# Patient Record
Sex: FEMALE | Race: WHITE | URBAN - METROPOLITAN AREA
[De-identification: names, ages, dates, MRNs, and addresses within clinical notes are randomized per-mention and may not be internally consistent; named-entity substitution may affect disease eponyms.]

---

## 2019-01-01 ENCOUNTER — INFUSION THERAPY VISIT (OUTPATIENT)
Dept: INFUSION THERAPY | Facility: CLINIC | Age: 49
End: 2019-01-01
Attending: INTERNAL MEDICINE
Payer: COMMERCIAL

## 2019-01-01 ENCOUNTER — OFFICE VISIT (OUTPATIENT)
Dept: GASTROENTEROLOGY | Facility: CLINIC | Age: 49
End: 2019-01-01
Attending: INTERNAL MEDICINE
Payer: COMMERCIAL

## 2019-01-01 ENCOUNTER — TELEPHONE (OUTPATIENT)
Dept: GASTROENTEROLOGY | Facility: CLINIC | Age: 49
End: 2019-01-01

## 2019-01-01 ENCOUNTER — HEALTH MAINTENANCE LETTER (OUTPATIENT)
Age: 49
End: 2019-01-01

## 2019-01-01 ENCOUNTER — MYC MEDICAL ADVICE (OUTPATIENT)
Dept: GASTROENTEROLOGY | Facility: CLINIC | Age: 49
End: 2019-01-01

## 2019-01-01 ENCOUNTER — TRANSFERRED RECORDS (OUTPATIENT)
Dept: HEALTH INFORMATION MANAGEMENT | Facility: CLINIC | Age: 49
End: 2019-01-01

## 2019-01-01 ENCOUNTER — ANCILLARY PROCEDURE (OUTPATIENT)
Dept: ULTRASOUND IMAGING | Facility: CLINIC | Age: 49
End: 2019-01-01
Attending: INTERNAL MEDICINE
Payer: COMMERCIAL

## 2019-01-01 VITALS
TEMPERATURE: 98.3 F | HEART RATE: 90 BPM | HEIGHT: 62 IN | DIASTOLIC BLOOD PRESSURE: 65 MMHG | RESPIRATION RATE: 16 BRPM | BODY MASS INDEX: 32.97 KG/M2 | OXYGEN SATURATION: 99 % | SYSTOLIC BLOOD PRESSURE: 112 MMHG | WEIGHT: 179.2 LBS

## 2019-01-01 VITALS
HEART RATE: 92 BPM | TEMPERATURE: 98.2 F | OXYGEN SATURATION: 97 % | RESPIRATION RATE: 16 BRPM | DIASTOLIC BLOOD PRESSURE: 66 MMHG | SYSTOLIC BLOOD PRESSURE: 112 MMHG

## 2019-01-01 VITALS
OXYGEN SATURATION: 97 % | DIASTOLIC BLOOD PRESSURE: 70 MMHG | SYSTOLIC BLOOD PRESSURE: 113 MMHG | HEART RATE: 89 BPM | RESPIRATION RATE: 16 BRPM | TEMPERATURE: 98.5 F

## 2019-01-01 VITALS
RESPIRATION RATE: 18 BRPM | DIASTOLIC BLOOD PRESSURE: 71 MMHG | TEMPERATURE: 97.5 F | HEART RATE: 85 BPM | OXYGEN SATURATION: 94 % | SYSTOLIC BLOOD PRESSURE: 116 MMHG

## 2019-01-01 VITALS
HEART RATE: 88 BPM | OXYGEN SATURATION: 97 % | SYSTOLIC BLOOD PRESSURE: 124 MMHG | RESPIRATION RATE: 16 BRPM | TEMPERATURE: 98.3 F | DIASTOLIC BLOOD PRESSURE: 74 MMHG

## 2019-01-01 DIAGNOSIS — D50.0 IRON DEFICIENCY ANEMIA DUE TO CHRONIC BLOOD LOSS: ICD-10-CM

## 2019-01-01 DIAGNOSIS — K70.31 ALCOHOLIC CIRRHOSIS OF LIVER WITH ASCITES (H): Primary | ICD-10-CM

## 2019-01-01 DIAGNOSIS — F43.10 PTSD (POST-TRAUMATIC STRESS DISORDER): ICD-10-CM

## 2019-01-01 DIAGNOSIS — D50.0 IRON DEFICIENCY ANEMIA DUE TO CHRONIC BLOOD LOSS: Primary | ICD-10-CM

## 2019-01-01 DIAGNOSIS — F41.1 GAD (GENERALIZED ANXIETY DISORDER): ICD-10-CM

## 2019-01-01 DIAGNOSIS — F10.10 ALCOHOL ABUSE: ICD-10-CM

## 2019-01-01 DIAGNOSIS — N18.30 CKD (CHRONIC KIDNEY DISEASE) STAGE 3, GFR 30-59 ML/MIN (H): ICD-10-CM

## 2019-01-01 DIAGNOSIS — Z95.828 S/P TIPS (TRANSJUGULAR INTRAHEPATIC PORTOSYSTEMIC SHUNT): ICD-10-CM

## 2019-01-01 DIAGNOSIS — K70.31 ALCOHOLIC CIRRHOSIS OF LIVER WITH ASCITES (H): ICD-10-CM

## 2019-01-01 DIAGNOSIS — E43 SEVERE PROTEIN-CALORIE MALNUTRITION (H): ICD-10-CM

## 2019-01-01 LAB
ABO + RH BLD: ABNORMAL
ABO + RH BLD: ABNORMAL
ALBUMIN SERPL-MCNC: 2.2 G/DL (ref 3.4–5)
ALBUMIN SERPL-MCNC: 2.8 G/DL (ref 3.4–5)
ALBUMIN SERPL-MCNC: 3.3 G/DL (ref 3.4–5)
ALP SERPL-CCNC: 126 U/L (ref 40–150)
ALP SERPL-CCNC: 156 U/L (ref 40–150)
ALP SERPL-CCNC: 96 U/L (ref 40–150)
ALT SERPL W P-5'-P-CCNC: 19 U/L (ref 0–50)
ALT SERPL W P-5'-P-CCNC: 20 U/L (ref 0–50)
ALT SERPL W P-5'-P-CCNC: 37 U/L (ref 0–50)
ANION GAP SERPL CALCULATED.3IONS-SCNC: 12 MMOL/L (ref 3–14)
ANION GAP SERPL CALCULATED.3IONS-SCNC: 7 MMOL/L (ref 3–14)
ANION GAP SERPL CALCULATED.3IONS-SCNC: 9 MMOL/L (ref 3–14)
AST SERPL W P-5'-P-CCNC: 112 U/L (ref 0–45)
AST SERPL W P-5'-P-CCNC: 30 U/L (ref 0–45)
AST SERPL W P-5'-P-CCNC: 35 U/L (ref 0–45)
BASOPHILS # BLD AUTO: 0 10E9/L (ref 0–0.2)
BASOPHILS # BLD AUTO: 0.1 10E9/L (ref 0–0.2)
BASOPHILS NFR BLD AUTO: 0.2 %
BASOPHILS NFR BLD AUTO: 2.6 %
BILIRUB DIRECT SERPL-MCNC: 1.2 MG/DL (ref 0–0.2)
BILIRUB DIRECT SERPL-MCNC: 1.8 MG/DL (ref 0–0.2)
BILIRUB DIRECT SERPL-MCNC: 2.8 MG/DL (ref 0–0.2)
BILIRUB SERPL-MCNC: 2.8 MG/DL (ref 0.2–1.3)
BILIRUB SERPL-MCNC: 4.4 MG/DL (ref 0.2–1.3)
BILIRUB SERPL-MCNC: 6 MG/DL (ref 0.2–1.3)
BLD GP AB INVEST PLASRBC-IMP: ABNORMAL
BLD GP AB SCN SERPL QL: ABNORMAL
BLD PROD TYP BPU: ABNORMAL
BLD PROD TYP BPU: NORMAL
BLD UNIT ID BPU: 0
BLOOD BANK CMNT PATIENT-IMP: ABNORMAL
BLOOD BANK CMNT PATIENT-IMP: ABNORMAL
BLOOD PRODUCT CODE: NORMAL
BPU ID: NORMAL
BUN SERPL-MCNC: 20 MG/DL (ref 7–30)
BUN SERPL-MCNC: 7 MG/DL (ref 7–30)
BUN SERPL-MCNC: 7 MG/DL (ref 7–30)
CALCIUM SERPL-MCNC: 7.7 MG/DL (ref 8.5–10.1)
CALCIUM SERPL-MCNC: 8.4 MG/DL (ref 8.5–10.1)
CALCIUM SERPL-MCNC: 8.4 MG/DL (ref 8.5–10.1)
CHLORIDE SERPL-SCNC: 103 MMOL/L (ref 94–109)
CHLORIDE SERPL-SCNC: 105 MMOL/L (ref 94–109)
CHLORIDE SERPL-SCNC: 109 MMOL/L (ref 94–109)
CO2 SERPL-SCNC: 22 MMOL/L (ref 20–32)
CO2 SERPL-SCNC: 25 MMOL/L (ref 20–32)
CO2 SERPL-SCNC: 27 MMOL/L (ref 20–32)
COPATH REPORT: NORMAL
CREAT SERPL-MCNC: 0.87 MG/DL (ref 0.52–1.04)
CREAT SERPL-MCNC: 1.45 MG/DL (ref 0.52–1.04)
CREAT SERPL-MCNC: 1.85 MG/DL (ref 0.52–1.04)
DIFFERENTIAL METHOD BLD: ABNORMAL
DIFFERENTIAL METHOD BLD: ABNORMAL
EOSINOPHIL # BLD AUTO: 0.1 10E9/L (ref 0–0.7)
EOSINOPHIL # BLD AUTO: 0.3 10E9/L (ref 0–0.7)
EOSINOPHIL NFR BLD AUTO: 4.3 %
EOSINOPHIL NFR BLD AUTO: 4.6 %
ERYTHROCYTE [DISTWIDTH] IN BLOOD BY AUTOMATED COUNT: 16 % (ref 10–15)
ERYTHROCYTE [DISTWIDTH] IN BLOOD BY AUTOMATED COUNT: 16.3 % (ref 10–15)
ERYTHROCYTE [DISTWIDTH] IN BLOOD BY AUTOMATED COUNT: 19 % (ref 10–15)
ERYTHROCYTE [DISTWIDTH] IN BLOOD BY AUTOMATED COUNT: 19.6 % (ref 10–15)
FERRITIN SERPL-MCNC: 14 NG/ML (ref 8–252)
FERRITIN SERPL-MCNC: 23 NG/ML (ref 8–252)
GFR SERPL CREATININE-BSD FRML MDRD: 31 ML/MIN/{1.73_M2}
GFR SERPL CREATININE-BSD FRML MDRD: 42 ML/MIN/{1.73_M2}
GFR SERPL CREATININE-BSD FRML MDRD: 78 ML/MIN/{1.73_M2}
GLUCOSE SERPL-MCNC: 101 MG/DL (ref 70–99)
GLUCOSE SERPL-MCNC: 119 MG/DL (ref 70–99)
GLUCOSE SERPL-MCNC: 144 MG/DL (ref 70–99)
HCT VFR BLD AUTO: 12.2 % (ref 35–47)
HCT VFR BLD AUTO: 14.1 % (ref 35–47)
HCT VFR BLD AUTO: 23.2 % (ref 35–47)
HCT VFR BLD AUTO: 30.2 % (ref 35–47)
HGB BLD-MCNC: 3.4 G/DL (ref 11.7–15.7)
HGB BLD-MCNC: 3.7 G/DL (ref 11.7–15.7)
HGB BLD-MCNC: 7 G/DL (ref 11.7–15.7)
HGB BLD-MCNC: 9.1 G/DL (ref 11.7–15.7)
IMM GRANULOCYTES # BLD: 0 10E9/L (ref 0–0.4)
IMM GRANULOCYTES # BLD: 0.1 10E9/L (ref 0–0.4)
IMM GRANULOCYTES NFR BLD: 0.3 %
IMM GRANULOCYTES NFR BLD: 1.2 %
INR PPP: 1.75 (ref 0.86–1.14)
INR PPP: 1.92 (ref 0.86–1.14)
INR PPP: 2.01 (ref 0.86–1.14)
IRON SATN MFR SERPL: 10 % (ref 15–46)
IRON SATN MFR SERPL: 5 % (ref 15–46)
IRON SERPL-MCNC: 18 UG/DL (ref 35–180)
IRON SERPL-MCNC: 34 UG/DL (ref 35–180)
LYMPHOCYTES # BLD AUTO: 0.7 10E9/L (ref 0.8–5.3)
LYMPHOCYTES # BLD AUTO: 1 10E9/L (ref 0.8–5.3)
LYMPHOCYTES NFR BLD AUTO: 16.9 %
LYMPHOCYTES NFR BLD AUTO: 24.4 %
MCH RBC QN AUTO: 26.2 PG (ref 26.5–33)
MCH RBC QN AUTO: 29.8 PG (ref 26.5–33)
MCH RBC QN AUTO: 30.5 PG (ref 26.5–33)
MCH RBC QN AUTO: 30.6 PG (ref 26.5–33)
MCHC RBC AUTO-ENTMCNC: 26.2 G/DL (ref 31.5–36.5)
MCHC RBC AUTO-ENTMCNC: 27.9 G/DL (ref 31.5–36.5)
MCHC RBC AUTO-ENTMCNC: 30.1 G/DL (ref 31.5–36.5)
MCHC RBC AUTO-ENTMCNC: 30.2 G/DL (ref 31.5–36.5)
MCV RBC AUTO: 100 FL (ref 78–100)
MCV RBC AUTO: 101 FL (ref 78–100)
MCV RBC AUTO: 110 FL (ref 78–100)
MCV RBC AUTO: 99 FL (ref 78–100)
MONOCYTES # BLD AUTO: 0.4 10E9/L (ref 0–1.3)
MONOCYTES # BLD AUTO: 0.7 10E9/L (ref 0–1.3)
MONOCYTES NFR BLD AUTO: 11.5 %
MONOCYTES NFR BLD AUTO: 12.2 %
NEUTROPHILS # BLD AUTO: 1.7 10E9/L (ref 1.6–8.3)
NEUTROPHILS # BLD AUTO: 3.8 10E9/L (ref 1.6–8.3)
NEUTROPHILS NFR BLD AUTO: 55.9 %
NEUTROPHILS NFR BLD AUTO: 65.9 %
NRBC # BLD AUTO: 0 10*3/UL
NRBC # BLD AUTO: 0 10*3/UL
NRBC BLD AUTO-RTO: 0 /100
NRBC BLD AUTO-RTO: 0 /100
NUM BPU REQUESTED: 4
PLATELET # BLD AUTO: 103 10E9/L (ref 150–450)
PLATELET # BLD AUTO: 115 10E9/L (ref 150–450)
PLATELET # BLD AUTO: 74 10E9/L (ref 150–450)
PLATELET # BLD AUTO: 79 10E9/L (ref 150–450)
POTASSIUM SERPL-SCNC: 3.1 MMOL/L (ref 3.4–5.3)
POTASSIUM SERPL-SCNC: 3.2 MMOL/L (ref 3.4–5.3)
POTASSIUM SERPL-SCNC: 3.2 MMOL/L (ref 3.4–5.3)
PROT SERPL-MCNC: 5.1 G/DL (ref 6.8–8.8)
PROT SERPL-MCNC: 6.4 G/DL (ref 6.8–8.8)
PROT SERPL-MCNC: 7.3 G/DL (ref 6.8–8.8)
RBC # BLD AUTO: 1.11 10E12/L (ref 3.8–5.2)
RBC # BLD AUTO: 1.41 10E12/L (ref 3.8–5.2)
RBC # BLD AUTO: 2.35 10E12/L (ref 3.8–5.2)
RBC # BLD AUTO: 2.98 10E12/L (ref 3.8–5.2)
RETICS # AUTO: 118.9 10E9/L (ref 25–95)
RETICS/RBC NFR AUTO: 8.4 % (ref 0.5–2)
SODIUM SERPL-SCNC: 137 MMOL/L (ref 133–144)
SODIUM SERPL-SCNC: 139 MMOL/L (ref 133–144)
SODIUM SERPL-SCNC: 142 MMOL/L (ref 133–144)
SPECIMEN EXP DATE BLD: ABNORMAL
TIBC SERPL-MCNC: 336 UG/DL (ref 240–430)
TIBC SERPL-MCNC: 348 UG/DL (ref 240–430)
TRANSFUSION STATUS PATIENT QL: NORMAL
WBC # BLD AUTO: 3 10E9/L (ref 4–11)
WBC # BLD AUTO: 3.8 10E9/L (ref 4–11)
WBC # BLD AUTO: 4.8 10E9/L (ref 4–11)
WBC # BLD AUTO: 5.8 10E9/L (ref 4–11)

## 2019-01-01 PROCEDURE — 85045 AUTOMATED RETICULOCYTE COUNT: CPT | Performed by: INTERNAL MEDICINE

## 2019-01-01 PROCEDURE — 25800030 ZZH RX IP 258 OP 636: Mod: ZF | Performed by: INTERNAL MEDICINE

## 2019-01-01 PROCEDURE — 96365 THER/PROPH/DIAG IV INF INIT: CPT

## 2019-01-01 PROCEDURE — 86900 BLOOD TYPING SEROLOGIC ABO: CPT | Performed by: INTERNAL MEDICINE

## 2019-01-01 PROCEDURE — P9016 RBC LEUKOCYTES REDUCED: HCPCS | Performed by: INTERNAL MEDICINE

## 2019-01-01 PROCEDURE — G0463 HOSPITAL OUTPT CLINIC VISIT: HCPCS | Mod: ZF

## 2019-01-01 PROCEDURE — 85025 COMPLETE CBC W/AUTO DIFF WBC: CPT | Performed by: INTERNAL MEDICINE

## 2019-01-01 PROCEDURE — 86850 RBC ANTIBODY SCREEN: CPT | Performed by: INTERNAL MEDICINE

## 2019-01-01 PROCEDURE — 96375 TX/PRO/DX INJ NEW DRUG ADDON: CPT

## 2019-01-01 PROCEDURE — 80048 BASIC METABOLIC PNL TOTAL CA: CPT | Performed by: INTERNAL MEDICINE

## 2019-01-01 PROCEDURE — 96376 TX/PRO/DX INJ SAME DRUG ADON: CPT

## 2019-01-01 PROCEDURE — 96374 THER/PROPH/DIAG INJ IV PUSH: CPT

## 2019-01-01 PROCEDURE — 86922 COMPATIBILITY TEST ANTIGLOB: CPT | Performed by: INTERNAL MEDICINE

## 2019-01-01 PROCEDURE — 25000128 H RX IP 250 OP 636: Mod: ZF | Performed by: INTERNAL MEDICINE

## 2019-01-01 PROCEDURE — 36430 TRANSFUSION BLD/BLD COMPNT: CPT

## 2019-01-01 PROCEDURE — 40000611 ZZHCL STATISTIC MORPHOLOGY W/INTERP HEMEPATH TC 85060: Performed by: INTERNAL MEDICINE

## 2019-01-01 PROCEDURE — 86870 RBC ANTIBODY IDENTIFICATION: CPT | Performed by: INTERNAL MEDICINE

## 2019-01-01 PROCEDURE — 86902 BLOOD TYPE ANTIGEN DONOR EA: CPT | Performed by: INTERNAL MEDICINE

## 2019-01-01 PROCEDURE — 85610 PROTHROMBIN TIME: CPT | Performed by: INTERNAL MEDICINE

## 2019-01-01 PROCEDURE — 83550 IRON BINDING TEST: CPT | Performed by: INTERNAL MEDICINE

## 2019-01-01 PROCEDURE — 36415 COLL VENOUS BLD VENIPUNCTURE: CPT | Performed by: INTERNAL MEDICINE

## 2019-01-01 PROCEDURE — 40000556 ZZH STATISTIC PERIPHERAL IV START W US GUIDANCE: Mod: ZF

## 2019-01-01 PROCEDURE — 86901 BLOOD TYPING SEROLOGIC RH(D): CPT | Performed by: INTERNAL MEDICINE

## 2019-01-01 PROCEDURE — 83540 ASSAY OF IRON: CPT | Performed by: INTERNAL MEDICINE

## 2019-01-01 PROCEDURE — 80076 HEPATIC FUNCTION PANEL: CPT | Performed by: INTERNAL MEDICINE

## 2019-01-01 PROCEDURE — 82728 ASSAY OF FERRITIN: CPT | Performed by: INTERNAL MEDICINE

## 2019-01-01 PROCEDURE — 85027 COMPLETE CBC AUTOMATED: CPT | Performed by: INTERNAL MEDICINE

## 2019-01-01 PROCEDURE — 96366 THER/PROPH/DIAG IV INF ADDON: CPT

## 2019-01-01 RX ORDER — HEPARIN SODIUM (PORCINE) LOCK FLUSH IV SOLN 100 UNIT/ML 100 UNIT/ML
5 SOLUTION INTRAVENOUS
Status: CANCELLED | OUTPATIENT
Start: 2019-01-01

## 2019-01-01 RX ORDER — PANTOPRAZOLE SODIUM 40 MG/1
1 TABLET, DELAYED RELEASE ORAL DAILY
Refills: 3 | COMMUNITY
Start: 2019-01-01

## 2019-01-01 RX ORDER — FERROUS GLUCONATE 324(38)MG
1 TABLET ORAL 2 TIMES DAILY
Refills: 3 | COMMUNITY
Start: 2019-01-01

## 2019-01-01 RX ORDER — HEPARIN SODIUM,PORCINE 10 UNIT/ML
5 VIAL (ML) INTRAVENOUS
Status: CANCELLED | OUTPATIENT
Start: 2019-01-01

## 2019-01-01 RX ORDER — METHYLPREDNISOLONE SODIUM SUCCINATE 125 MG/2ML
125 INJECTION, POWDER, LYOPHILIZED, FOR SOLUTION INTRAMUSCULAR; INTRAVENOUS ONCE
Status: CANCELLED | OUTPATIENT
Start: 2019-01-01

## 2019-01-01 RX ORDER — SERTRALINE HYDROCHLORIDE 100 MG/1
100 TABLET, FILM COATED ORAL DAILY
Refills: 1 | COMMUNITY
Start: 2019-01-01

## 2019-01-01 RX ORDER — FUROSEMIDE 10 MG/ML
40 INJECTION INTRAMUSCULAR; INTRAVENOUS ONCE
Status: COMPLETED | OUTPATIENT
Start: 2019-01-01 | End: 2019-01-01

## 2019-01-01 RX ORDER — FUROSEMIDE 10 MG/ML
40 INJECTION INTRAMUSCULAR; INTRAVENOUS ONCE
Status: CANCELLED
Start: 2019-01-01

## 2019-01-01 RX ORDER — PRAMIPEXOLE DIHYDROCHLORIDE 0.12 MG/1
1 TABLET ORAL 2 TIMES DAILY
Refills: 1 | COMMUNITY
Start: 2019-01-01

## 2019-01-01 RX ORDER — LACTULOSE 10 G/15ML
15 SOLUTION ORAL DAILY
Refills: 5 | COMMUNITY
Start: 2018-09-24

## 2019-01-01 RX ORDER — ONDANSETRON 2 MG/ML
4 INJECTION INTRAMUSCULAR; INTRAVENOUS ONCE
Status: COMPLETED | OUTPATIENT
Start: 2019-01-01 | End: 2019-01-01

## 2019-01-01 RX ORDER — FUROSEMIDE 40 MG
40 TABLET ORAL DAILY
Refills: 1 | COMMUNITY
Start: 2019-01-01

## 2019-01-01 RX ORDER — POTASSIUM CHLORIDE 750 MG/1
20 TABLET, EXTENDED RELEASE ORAL DAILY
Refills: 3 | COMMUNITY
Start: 2019-01-01

## 2019-01-01 RX ORDER — LORAZEPAM 0.5 MG/1
1 TABLET ORAL PRN
Refills: 0 | COMMUNITY
Start: 2019-01-01

## 2019-01-01 RX ORDER — LIDOCAINE 50 MG/G
1 PATCH TOPICAL DAILY PRN
Refills: 11 | COMMUNITY
Start: 2019-01-01

## 2019-01-01 RX ADMIN — ONDANSETRON 4 MG: 2 INJECTION INTRAMUSCULAR; INTRAVENOUS at 13:16

## 2019-01-01 RX ADMIN — SODIUM CHLORIDE 25 MG: 9 INJECTION, SOLUTION INTRAVENOUS at 13:23

## 2019-01-01 RX ADMIN — FUROSEMIDE 40 MG: 10 INJECTION, SOLUTION INTRAMUSCULAR; INTRAVENOUS at 13:29

## 2019-01-01 RX ADMIN — SODIUM CHLORIDE 500 MG: 9 INJECTION, SOLUTION INTRAVENOUS at 13:14

## 2019-01-01 RX ADMIN — SODIUM CHLORIDE 475 MG: 9 INJECTION, SOLUTION INTRAVENOUS at 14:48

## 2019-01-01 RX ADMIN — FUROSEMIDE 40 MG: 10 INJECTION, SOLUTION INTRAVENOUS at 13:05

## 2019-01-01 ASSESSMENT — PAIN SCALES - GENERAL: PAINLEVEL: NO PAIN (0)

## 2019-01-01 ASSESSMENT — MIFFLIN-ST. JEOR: SCORE: 1391.1

## 2019-04-23 PROBLEM — D50.0 IRON DEFICIENCY ANEMIA DUE TO CHRONIC BLOOD LOSS: Status: ACTIVE | Noted: 2019-01-01

## 2019-04-23 NOTE — PROGRESS NOTES
"Date of Service: 4/23/2019     Referring Provider: Mitzi Rowell MD    Subjective:            Falguni Leslie is a 49 year old female presenting for evaluation of liver disease    History of Present Illness   Falguni Leslie is a 49 year old female with past medical history of generalized anxiety disorder, panic disorder, PTSD, obesity, hypertension, CKD stage 3, iron deficiency anemia, obstructive sleep apnea, and long-standing history of alcohol misuse with resultant alcoholic cirrhosis complicated by encephalopathy, volume overload, bleeding varices status post TIPS, and recurrent blood loss anemia who presents in consultation.    She reports a long-standing history of alcohol misuse, drinking upwards of 2 bottles of wine daily.  She reports that her last alcohol use was February 2019.  She was diagnosed with cirrhosis several years ago, and is been seen in consultation at [facility] in [place], [place] in June 2018.  Up until January 2018 she was working as a  for financial services with [employer], but now is out on severance.  She has been informed of the need to stop drinking for several years secondary to chronic liver disease, but admits that she drank as recently as February 2019 secondary to \"life issues.\"    She was admitted to [facility] from January 4 - February 1, 2018 with intermittent melena and bright red blood.  At that time she underwent endoscopic evaluation which ultimately demonstrated some gastric ulcers, small esophageal varices, and rectal varices.  She continued to have issues with blood loss and required dozens of blood transfusions during the hospital course.  She was quite sick from her liver disease and she was seen by palliative care during that hospitalization, and ultimately she was discharged home to hospice care.  She felt that she had significant trauma related to that hospitalization, and is now very averse to any hospitalization " regarding her overall health care.  She is no longer enrolled in hospice and does have a desire to live, despite that she continues to drink alcohol.    Noted that there have been several presentations to the emergency department or hospitalizations where he she has requested early discharged AGAINST MEDICAL ADVICE as she did not want procedures or prolonged hospitalizations    She was seen by her primary care provider April 16, 2019 for concerns of weakness, volume overload.  Lab evaluation demonstrated a hemoglobin of 3.5 g; was recommended she be admitted to the hospital for glass of blood transfusion and evaluation as to etiology of blood loss.  She refused hospitalization, and no outpatient transfusion was ordered.    She believes that she is thinking fairly clearly, however sometimes she does notice confusion.  She notes that she has significant volume overload in all of her extremities as well as her face which is new over the last month.  She notes that she is typically having 2-3 bowel movements a day without the use of lactulose.  She does not comment on any overt GI bleeding in the last 2 weeks    Past Medical History:  -Generalized anxiety disorder  -Panic disorder  -Self diagnosed PTSD  -Alcohol misuse  -Hypertension  -Obesity  -Iron deficiency anemia  -Alcohol related cirrhosis  -Status post TIPS  - CKD stage 3 (since 1/2018)      Surgical History:  - s/p TIPS 1/2018, revision 1/2018    Social History:  Social History     Tobacco Use     Smoking status: Never Smoker     Smokeless tobacco: Never Used   Substance Use Topics     Alcohol use: Not Currently     Comment: Last drink begining Feb. 2019     Drug use: Never   -She reports that she currently lives with her  and her 12-year-old daughter  -She is in the process of getting a divorce from her   -She previously worked as a  of financial services for a local company  -She reports drinking 1-2 bottles of wine most days of the week the  "majority of her adult life.  Notes that she drinks sporadically now with last drink in 2019  -She reports that she has done an outpatient treatment program with [facility], but left early as she \"did not relate to the young women that were also in the program\"    Family History:  -Father  of complications of a GI bleed in the setting of cirrhosis  -Mother had multiple multiple mental health issues    Medications:  Current Outpatient Medications   Medication     ferrous gluconate (FERGON) 324 (38 Fe) MG tablet     furosemide (LASIX) 40 MG tablet     lidocaine (LIDODERM) 5 % patch     LORazepam (ATIVAN) 0.5 MG tablet     pantoprazole (PROTONIX) 40 MG EC tablet     potassium chloride ER (K-TAB/KLOR-CON) 10 MEQ CR tablet     pramipexole (MIRAPEX) 0.125 MG tablet     sertraline (ZOLOFT) 100 MG tablet     lactulose (CHRONULAC) 10 GM/15ML solution     No current facility-administered medications for this visit.        Review of Systems  A complete 10 point review of systems was asked and answered in the negative unless specifically commented upon in the HPI    Objective:         Vitals:    19 0949   BP: 112/65   BP Location: Right arm   Patient Position: Sitting   Cuff Size: Adult Regular   Pulse: 90   Resp: 16   Temp: 98.3  F (36.8  C)   TempSrc: Oral   SpO2: 99%   Weight: 81.3 kg (179 lb 3.2 oz)   Height: 1.575 m (5' 2\")     Body mass index is 32.78 kg/m .     Physical Exam  Constitutional: Well-developed, mild distress.    HEENT: Normocephalic. Jefferson facies, + scleral icterus. Moist oral mucosa. Dentition normal  Neck/Lymph: Normal ROM, supple. No thyromegaly.  Cardiac:  Tachycardic, flow murmur  Respiratory: Clear to auscultation bilaterally.  No wheezes or rales  GI:  Abdomen soft, distended, obese, non-tender. BS present. no shifting dullness.   Skin:  Skin is warm and dry. No rash noted.  no jaundice. Pale. no spider nevi noted.  no palmar erythema  Peripheral Vascular: 2+ lower extremity edema. 2+ " pulses in all extremities  Musculoskeletal:  ROM intact, decreased muscle bulk    Psychiatric: Normal mood and affect. Behavior is normal.  Neuro:  no asterixis, no tremor    Labs and Diagnostic tests:  Lab Results   Component Value Date     04/23/2019    POTASSIUM 3.2 04/23/2019    CHLORIDE 103 04/23/2019    CO2 27 04/23/2019    BUN 7 04/23/2019    CR 1.45 04/23/2019     Lab Results   Component Value Date    BILITOTAL 2.8 04/23/2019    ALT 20 04/23/2019    AST 35 04/23/2019    ALKPHOS 126 04/23/2019     Lab Results   Component Value Date    ALBUMIN 2.8 04/23/2019    PROTTOTAL 6.4 04/23/2019      Lab Results   Component Value Date    WBC 5.8 04/23/2019    HGB 3.7 04/23/2019     04/23/2019     04/23/2019     Lab Results   Component Value Date    INR 1.75 04/23/2019       MELD-Na score: 21 at 4/23/2019 10:57 AM  MELD score: 21 at 4/23/2019 10:57 AM  Calculated from:  Serum Creatinine: 1.45 mg/dL at 4/23/2019 10:57 AM  Serum Sodium: 137 mmol/L at 4/23/2019 10:57 AM  Total Bilirubin: 2.8 mg/dL at 4/23/2019 10:57 AM  INR(ratio): 1.75 at 4/23/2019 10:57 AM  Age: 49 years    Imaging:  Abdominal US 1/26/2019  FINDINGS:  GALLBLADDER: Normal, without cholelithiasis.  BILE DUCTS: Common bile duct is not well seen.  LIVER: Liver is difficult to fully penetrate. No focal mass.  SPLEEN: Spleen is enlarged measuring 20.2 x 9.8 x 11.9 cm.  RIGHT KIDNEY: 10.2 cm in length. Normal. No hydronephrosis.  LEFT KIDNEY: 13.9 cm in length. Normal. No hydronephrosis.  PANCREAS: Pancreas is unremarkable, however, the tail is not well-seen due to bowel gas.  AORTA: Normal in caliber.  IVC: Normal, patent where seen.  ABDOMINAL DUPLEX: Main portal vein demonstrates normal direction of flow, however, the right portal vein and left portal vein demonstrate reversal of flow. The TIPS appears patent without any increased velocity proximally, within the shunt or at the distal end of the shunt. Hepatic veins also demonstrate normal  direction of flow. Please note the right hepatic vein is poorly seen.  Artery demonstrates normal direction of flow and a normal waveform. Splenic vein at the spleen demonstrates normal direction of flow. Splenic vein at the pancreas demonstrates normal direction of flow.  No ascites.      Procedures:  EGD: 1/31/2018  Findings:       The examined esophagus was normal.       Striped mildly erythematous mucosa without bleeding was found in the        gastric body and in the gastric antrum. A few clips were noted from the        prior exam. A few small non bleeding avm's near the clip site were        treated with argon coagulation. On withdrawal of the colonoscope, some        bleeding on a clip site was treated with argon as well.       The duodenal bulb, second portion of the duodenum, third portion of the        duodenum and fourth portion of the duodenum were normal. Clip sites were        noted in the second portion of the duodenum. On withdrawal one of these        sites (with 4 clips) seemed to ooze a little. It was treated with argon coagulation.       Following the standard upper endoscopy a pediatric colonoscope was        advanced per oral to proximal jejunum. No additional avm's were        identified and the mucosa appeared normal.       The examined jejunum was normal.    Colonoscopy: 1/19/2018  Findings:       Non-bleeding internal hemorrhoids were found during retroflexion. The        hemorrhoids were moderate.       Several columns of large, non-bleeding rectal varices were found. No        stigmata of recent bleed. No blood was seen.       The exam was otherwise without abnormality. Green-brownish secretions        were seen in the rectum and throughout the examined colon. No blood was        seen.    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  Of note this 2 degrees increased secondary to her chronic kidney disease    Chronic blood loss anemia:  -Was noted that during a primary care visit last week she had hemoglobin of 3.5 g, and refused admission to the hospital for aggressive blood transfusions.  -Repeat labs on today's exam demonstrated a hemoglobin of 3.7 g.  Ultimately the patient will benefit from 4 units of packed red blood cells, which can be transfused over a 2-day period we did recommend.  -Hospitalization for her severe chronic anemia, however the patient was very clear that her trauma surrounding her hospitalization in January 2018 prevented her from willingness to be admitted  -Type and screen as well as blood product transfusion consent forms were obtained today  -Plan will be to transfuse 4 units PRBCs over 48-hour window  -We will plan for outpatient evaluation for GI blood loss    Chronic Mental Health Issues:  -If significant concerns about her mental health, as during her interview today was clear that she had issues with generalized anxiety, expressed concern with panic disorder, and has a  self diagnosis of PTSD from her hospitalization in January 2018  -While likely helping her symptoms, I am concerned that the sertraline and lorazepam that she is using is not adequate given her continued abuse of drinking behavior  -Would strongly recommend the patient obtain and follow with a mental health provider for psychotherapy    Hepatocellular Cancer Screening:   -Based on her acute decompensation felt appropriate to repeat an abdominal ultrasound with Doppler evaluation of her TIPS for evidence of dysfunction  - Recommend screening for HCC every 6 months with either abdominal ultrasound or by alternating abdominal ultrasound with EITHER a triple/quad phase CT Liver with IV contrast OR a Quad phase MRI Liver with IV contrast.  AFP levels should be checked every 6 months at time of imaging screen.    Ascites:  -Noticed that she has significant anasarca, which is likely related to her profound anemia  -She is on Lasix 40 mg daily for diuresis  -After she undergoes a blood transfusion will likely plan to start her on outpatient spironolactone as well  - Continue to follow a sodium restricted (<2g sodium diet)     Hepatic Encephalopathy:  -No acute issues    Esophageal Varices:   -As she has a TIPS, she does not need a repeat upper GI endoscopy at this time  - Recommend repeating an upper GI endoscopy January 2020.      Kidney Health:   She does have chronic kidney disease related to hemodynamic instability during a prolonged hospitalization in January 2018  -This is remained stable at stage III    Nutrition:  As with most patients with chronic liver disease, there is a significant degree of protein malnutrition.  Dicussed need to change dietary habits to improve overall protein balance.  Discussed the importance of eating between 1.2-1.5g/kg/day lean protein like eggs, fish, chicken, nuts, and legumes, in addition to a diet rich in fresh fruits and vegetables.  Continue to follow a sodium restricted (<2g sodium  diet) and discussed the need to minimize the intake of carbohydrates and sugars, to avoid obesity.   - Strongly encourage protein supplements 2-3 times daily (Boost, Ensure, Eagle Instant Milk, etc.) to meet protein and caloric intake.  - Recommend a bedtime snack with protein and complex carbohydrate to minimize risk of muscle catabolism overnight    Routine Health Care in Patient with Chronic Liver Disease:  - We recommend screening for hepatitis A and B, please vaccinate if not immune  - All patients with liver disease, particularly those with cholestatic liver disease, are at an increased risk for osteoporosis.  We strongly recommend screening for Vitamin D deficiency at least twice yearly with aggressive supplementation/replacement as indicated.    - We also recommend a screening DEXA scan to evaluate for osteoporosis.  If present, should treat with calcium, Vitamin D supplementation, and recommend consideration of bisphosphonate therapy.  Also recommend follow up DEXA scans to evaluate for improvement of bone density on therapy.  - All patients with liver disease should avoid the use of Non-steroidal Anti-Inflammatory (NSAID) medications as they can cause significant injury to the kidneys in this population    Follow Up:  2-3 months     Thank you very much for the opportunity to participate in the care of this patient.  If you have any further questions, please don't hesitate to contact our office.    Thomas M. Leventhal, M.D.   of Medicine  Advanced & Transplant Hepatology  [facility]     Approximately 45 minutes of non face-to-face time were spent in review of the patient's medical record to date.  This included review of previous: clinic visits, hospital records, lab results, imaging studies, and procedural documentation.  The findings from this review are summarized in the above note.

## 2019-04-23 NOTE — NURSING NOTE
"Chief Complaint   Patient presents with     Consult     Cirrhosis       Vital signs:  Temp: 98.3  F (36.8  C) Temp src: Oral BP: 112/65 Pulse: 90   Resp: 16 SpO2: 99 %     Height: 157.5 cm (5' 2\") Weight: 81.3 kg (179 lb 3.2 oz)  Estimated body mass index is 32.78 kg/m  as calculated from the following:    Height as of this encounter: 1.575 m (5' 2\").    Weight as of this encounter: 81.3 kg (179 lb 3.2 oz).          Yris Arguello [facility]  4/23/2019 9:52 AM      "

## 2019-04-23 NOTE — TELEPHONE ENCOUNTER
Patient returned writers call to verify she will be here tomorrow by 0700 for her blood transfusion.    Alma Coyle LPN  Hepatology Clinic    LVM that patient is scheduled in [facility] tomorrow morning at 0700 for 4 units of blood. Asked that patient call writer to confirm message or [facility] if not able to get a hold of writer both numbers given.    Alma Coyle LPN  Hepatology Clinic

## 2019-04-23 NOTE — PATIENT INSTRUCTIONS
- Labs today    - We will contact you with results and need for blood transfusion    - Please schedule an abdominal US    - Return to clinic in 2 months    - Please establish care with mental health provider    Cirrhosis Education  Nutrition  - For patients with cirrhosis, it is very important to eat the right types and amounts of foods.  We recommend a diet low in carbohydrates/sugars and high in fresh fruits/vegetables, with the right amount of protein.  We typically recommend 1.5gm/kg/day of protein, or  grams of protein every day.  - You should eat at least three meals a day and three to four snacks between meals  - Bedtime snacks are especially important (preferrably something with some protein)    - Patients with malnutrition and/or loss of muscular mass can improve their nutrition and muscular mass by drinking two cans of dietary supplements daily, particularly at bedtime.  These would include: Ensure, Boost, [place] Instant Milk, Glucerna, (or similar supplements)  - Please avoid eating raw seafood, especially shellfish, because of risk of serious illness      General Liver Health  - Continue to avoid the use of all non-steroid anti-inflammatory medicines (ibuprofen, Aleve, naproxen, aspirin, etc.) as this can cause serious injury to your kidneys in the setting of liver disease  - If you see a doctor and they prescribe you a new medication, please contact our clinic to let us know what changes are being made  - If you become acutely ill and present to an ER or are admitted to a hospital, please let us know as soon as you are able.  - We recommend that all patients with chronic liver disease be vaccinated against hepatitis A and B.  Please discuss with your primary provider the need for these vaccines  - As patients with liver disease are at an increased risk of developing osteoporosis, we recommend that you have a DEXA scan with appropriate follow up and treatment.   - We recommend screening for  Vitamin D deficiency (at least yearly) and aggressive replacement/supplementation as warranted.    Sleep Troubles:  - Sleep issues are very common in patients with chronic liver disease  - Recommend strict avoidance of medications such as narcotics, sedatives and sleep aids.    - Low dose benadryl or melatonin can be used for insomnia, if needed.

## 2019-04-23 NOTE — NURSING NOTE
DATE:  4/23/2019   TIME OF RECEIPT FROM LAB:  11:20  LAB TEST:  Hemoglobin  LAB VALUE:  3.7, consistent with previous hemoglobin, plan for transfusion.  RESULTS GIVEN WITH READ-BACK TO (PROVIDER):  LEVENTHAL, THOMAS MICHAEL  TIME LAB VALUE REPORTED TO PROVIDER:   11:30

## 2019-04-23 NOTE — LETTER
"4/23/2019       RE: Falguni Leslie  [address]  [place] [address]     Dear Colleague,    Thank you for referring your patient, Falguni Leslie, to the [facility] HEPATOLOGY at [facility]. Please see a copy of my visit note below.    Date of Service: 4/23/2019     Referring Provider: Mitzi Rowell MD    Subjective:            Falguni Leslie is a 49 year old female presenting for evaluation of liver disease    History of Present Illness   Falguni Leslie is a 49 year old female with past medical history of generalized anxiety disorder, panic disorder, PTSD, obesity, hypertension, CKD stage 3, iron deficiency anemia, obstructive sleep apnea, and long-standing history of alcohol misuse with resultant alcoholic cirrhosis complicated by encephalopathy, volume overload, bleeding varices status post TIPS, and recurrent blood loss anemia who presents in consultation.    She reports a long-standing history of alcohol misuse, drinking upwards of 2 bottles of wine daily.  She reports that her last alcohol use was February 2019.  She was diagnosed with cirrhosis several years ago, and is been seen in consultation at [facility] in [place], [place] in June 2018.  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  She has been informed of the need to stop drinking for several years secondary to chronic liver disease, but admits that she drank as recently as February 2019 secondary to \"life issues.\"    She was admitted to [facility] from January 4 - February 1, 2018 with intermittent melena and bright red blood.  At that time she underwent endoscopic evaluation which ultimately demonstrated some gastric ulcers, small esophageal varices, and rectal varices.  She continued to have issues with blood loss and required dozens of blood transfusions during the " hospital course.  She was quite sick from her liver disease and she was seen by palliative care during that hospitalization, and ultimately she was discharged home to hospice care.  She felt that she had significant trauma related to that hospitalization, and is now very averse to any hospitalization regarding her overall health care.  She is no longer enrolled in hospice and does have a desire to live, despite that she continues to drink alcohol.    Noted that there have been several presentations to the emergency department or hospitalizations where he she has requested early discharged AGAINST MEDICAL ADVICE as she did not want procedures or prolonged hospitalizations    She was seen by her primary care provider April 16, 2019 for concerns of weakness, volume overload.  Lab evaluation demonstrated a hemoglobin of 3.5 g; was recommended she be admitted to the hospital for glass of blood transfusion and evaluation as to etiology of blood loss.  She refused hospitalization, and no outpatient transfusion was ordered.    She believes that she is thinking fairly clearly, however sometimes she does notice confusion.  She notes that she has significant volume overload in all of her extremities as well as her face which is new over the last month.  She notes that she is typically having 2-3 bowel movements a day without the use of lactulose.  She does not comment on any overt GI bleeding in the last 2 weeks    Past Medical History:  -Generalized anxiety disorder  -Panic disorder  -Self diagnosed PTSD  -Alcohol misuse  -Hypertension  -Obesity  -Iron deficiency anemia  -Alcohol related cirrhosis  -Status post TIPS  - CKD stage 3 (since 1/2018)      Surgical History:  - s/p TIPS 1/2018, revision 1/2018    Social History:  Social History     Tobacco Use     Smoking status: Never Smoker     Smokeless tobacco: Never Used   Substance Use Topics     Alcohol use: Not Currently     Comment: Last drink begining Feb. 2019     Drug  "use: Never   -She reports that she currently lives with her  and her 12-year-old daughter  -She is in the process of getting a divorce from her   -She previously worked as a  of financial services for a local company  -She reports drinking 1-2 bottles of wine most days of the week the majority of her adult life.  Notes that she drinks sporadically now with last drink in 2019  -She reports that she has done an outpatient treatment program with [facility], but left early as she \"did not relate to the young women that were also in the program\"    Family History:  -Father  of complications of a GI bleed in the setting of cirrhosis  -Mother had multiple multiple mental health issues    Medications:  Current Outpatient Medications   Medication     ferrous gluconate (FERGON) 324 (38 Fe) MG tablet     furosemide (LASIX) 40 MG tablet     lidocaine (LIDODERM) 5 % patch     LORazepam (ATIVAN) 0.5 MG tablet     pantoprazole (PROTONIX) 40 MG EC tablet     potassium chloride ER (K-TAB/KLOR-CON) 10 MEQ CR tablet     pramipexole (MIRAPEX) 0.125 MG tablet     sertraline (ZOLOFT) 100 MG tablet     lactulose (CHRONULAC) 10 GM/15ML solution     No current facility-administered medications for this visit.        Review of Systems  A complete 10 point review of systems was asked and answered in the negative unless specifically commented upon in the HPI    Objective:         Vitals:    19 0949   BP: 112/65   BP Location: Right arm   Patient Position: Sitting   Cuff Size: Adult Regular   Pulse: 90   Resp: 16   Temp: 98.3  F (36.8  C)   TempSrc: Oral   SpO2: 99%   Weight: 81.3 kg (179 lb 3.2 oz)   Height: 1.575 m (5' 2\")     Body mass index is 32.78 kg/m .     Physical Exam  Constitutional: Well-developed, mild distress.    HEENT: Normocephalic. Jefferson facies, + scleral icterus. Moist oral mucosa. Dentition normal  Neck/Lymph: Normal ROM, supple. No thyromegaly.  Cardiac:  Tachycardic, flow " murmur  Respiratory: Clear to auscultation bilaterally.  No wheezes or rales  GI:  Abdomen soft, distended, obese, non-tender. BS present. no shifting dullness.   Skin:  Skin is warm and dry. No rash noted.  no jaundice. Pale. no spider nevi noted.  no palmar erythema  Peripheral Vascular: 2+ lower extremity edema. 2+ pulses in all extremities  Musculoskeletal:  ROM intact, decreased muscle bulk    Psychiatric: Normal mood and affect. Behavior is normal.  Neuro:  no asterixis, no tremor    Labs and Diagnostic tests:  Lab Results   Component Value Date     04/23/2019    POTASSIUM 3.2 04/23/2019    CHLORIDE 103 04/23/2019    CO2 27 04/23/2019    BUN 7 04/23/2019    CR 1.45 04/23/2019     Lab Results   Component Value Date    BILITOTAL 2.8 04/23/2019    ALT 20 04/23/2019    AST 35 04/23/2019    ALKPHOS 126 04/23/2019     Lab Results   Component Value Date    ALBUMIN 2.8 04/23/2019    PROTTOTAL 6.4 04/23/2019      Lab Results   Component Value Date    WBC 5.8 04/23/2019    HGB 3.7 04/23/2019     04/23/2019     04/23/2019     Lab Results   Component Value Date    INR 1.75 04/23/2019       MELD-Na score: 21 at 4/23/2019 10:57 AM  MELD score: 21 at 4/23/2019 10:57 AM  Calculated from:  Serum Creatinine: 1.45 mg/dL at 4/23/2019 10:57 AM  Serum Sodium: 137 mmol/L at 4/23/2019 10:57 AM  Total Bilirubin: 2.8 mg/dL at 4/23/2019 10:57 AM  INR(ratio): 1.75 at 4/23/2019 10:57 AM  Age: 49 years    Imaging:  Abdominal US 1/26/2019  FINDINGS:  GALLBLADDER: Normal, without cholelithiasis.  BILE DUCTS: Common bile duct is not well seen.  LIVER: Liver is difficult to fully penetrate. No focal mass.  SPLEEN: Spleen is enlarged measuring 20.2 x 9.8 x 11.9 cm.  RIGHT KIDNEY: 10.2 cm in length. Normal. No hydronephrosis.  LEFT KIDNEY: 13.9 cm in length. Normal. No hydronephrosis.  PANCREAS: Pancreas is unremarkable, however, the tail is not well-seen due to bowel gas.  AORTA: Normal in caliber.  IVC: Normal, patent where  seen.  ABDOMINAL DUPLEX: Main portal vein demonstrates normal direction of flow, however, the right portal vein and left portal vein demonstrate reversal of flow. The TIPS appears patent without any increased velocity proximally, within the shunt or at the distal end of the shunt. Hepatic veins also demonstrate normal direction of flow. Please note the right hepatic vein is poorly seen.  Artery demonstrates normal direction of flow and a normal waveform. Splenic vein at the spleen demonstrates normal direction of flow. Splenic vein at the pancreas demonstrates normal direction of flow.  No ascites.      Procedures:  EGD: 1/31/2018  Findings:       The examined esophagus was normal.       Striped mildly erythematous mucosa without bleeding was found in the        gastric body and in the gastric antrum. A few clips were noted from the        prior exam. A few small non bleeding avm's near the clip site were        treated with argon coagulation. On withdrawal of the colonoscope, some        bleeding on a clip site was treated with argon as well.       The duodenal bulb, second portion of the duodenum, third portion of the        duodenum and fourth portion of the duodenum were normal. Clip sites were        noted in the second portion of the duodenum. On withdrawal one of these        sites (with 4 clips) seemed to ooze a little. It was treated with argon coagulation.       Following the standard upper endoscopy a pediatric colonoscope was        advanced per oral to proximal jejunum. No additional avm's were        identified and the mucosa appeared normal.       The examined jejunum was normal.    Colonoscopy: 1/19/2018  Findings:       Non-bleeding internal hemorrhoids were found during retroflexion. The        hemorrhoids were moderate.       Several columns of large, non-bleeding rectal varices were found. No        stigmata of recent bleed. No blood was seen.       The exam was otherwise without abnormality.  Green-brownish secretions        were seen in the rectum and throughout the examined colon. No blood was        seen.    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  Of note this 2 degrees increased secondary to her chronic kidney disease    Chronic blood loss anemia:  -Was noted that during a primary care visit last week she had hemoglobin of 3.5 g, and refused admission to the hospital for aggressive blood transfusions.  -Repeat labs on today's exam demonstrated a hemoglobin of 3.7 g.  Ultimately the patient will benefit from 4 units of packed red blood cells, which can be transfused over a 2-day period we did recommend.  -Hospitalization for her severe chronic anemia, however the patient was very clear that her trauma surrounding her hospitalization in January 2018 prevented her from willingness to be admitted  -Type and screen as well as blood product transfusion consent forms were obtained today  -Plan will be to  transfuse 4 units PRBCs over 48-hour window  -We will plan for outpatient evaluation for GI blood loss    Chronic Mental Health Issues:  -If significant concerns about her mental health, as during her interview today was clear that she had issues with generalized anxiety, expressed concern with panic disorder, and has a self diagnosis of PTSD from her hospitalization in January 2018  -While likely helping her symptoms, I am concerned that the sertraline and lorazepam that she is using is not adequate given her continued abuse of drinking behavior  -Would strongly recommend the patient obtain and follow with a mental health provider for psychotherapy    Hepatocellular Cancer Screening:   -Based on her acute decompensation felt appropriate to repeat an abdominal ultrasound with Doppler evaluation of her TIPS for evidence of dysfunction  - Recommend screening for HCC every 6 months with either abdominal ultrasound or by alternating abdominal ultrasound with EITHER a triple/quad phase CT Liver with IV contrast OR a Quad phase MRI Liver with IV contrast.  AFP levels should be checked every 6 months at time of imaging screen.    Ascites:  -Noticed that she has significant anasarca, which is likely related to her profound anemia  -She is on Lasix 40 mg daily for diuresis  -After she undergoes a blood transfusion will likely plan to start her on outpatient spironolactone as well  - Continue to follow a sodium restricted (<2g sodium diet)     Hepatic Encephalopathy:  -No acute issues    Esophageal Varices:   -As she has a TIPS, she does not need a repeat upper GI endoscopy at this time  - Recommend repeating an upper GI endoscopy January 2020.      Kidney Health:   She does have chronic kidney disease related to hemodynamic instability during a prolonged hospitalization in January 2018  -This is remained stable at stage III    Nutrition:  As with most patients with chronic liver disease, there is a significant degree of  protein malnutrition.  Dicussed need to change dietary habits to improve overall protein balance.  Discussed the importance of eating between 1.2-1.5g/kg/day lean protein like eggs, fish, chicken, nuts, and legumes, in addition to a diet rich in fresh fruits and vegetables.  Continue to follow a sodium restricted (<2g sodium diet) and discussed the need to minimize the intake of carbohydrates and sugars, to avoid obesity.   - Strongly encourage protein supplements 2-3 times daily (Boost, Ensure, [employer] Instant Milk, etc.) to meet protein and caloric intake.  - Recommend a bedtime snack with protein and complex carbohydrate to minimize risk of muscle catabolism overnight    Routine Health Care in Patient with Chronic Liver Disease:  - We recommend screening for hepatitis A and B, please vaccinate if not immune  - All patients with liver disease, particularly those with cholestatic liver disease, are at an increased risk for osteoporosis.  We strongly recommend screening for Vitamin D deficiency at least twice yearly with aggressive supplementation/replacement as indicated.    - We also recommend a screening DEXA scan to evaluate for osteoporosis.  If present, should treat with calcium, Vitamin D supplementation, and recommend consideration of bisphosphonate therapy.  Also recommend follow up DEXA scans to evaluate for improvement of bone density on therapy.  - All patients with liver disease should avoid the use of Non-steroidal Anti-Inflammatory (NSAID) medications as they can cause significant injury to the kidneys in this population    Follow Up:  2-3 months     Thank you very much for the opportunity to participate in the care of this patient.  If you have any further questions, please don't hesitate to contact our office.    Thomas M. Leventhal, M.D.   of Medicine  Advanced & Transplant Hepatology  [facility]     Approximately 45 minutes of non face-to-face  time were spent in review of the patient's medical record to date.  This included review of previous: clinic visits, hospital records, lab results, imaging studies, and procedural documentation.  The findings from this review are summarized in the above note.      Again, thank you for allowing me to participate in the care of your patient.      Sincerely,    Thomas M. Leventhal, MD

## 2019-04-24 NOTE — PATIENT INSTRUCTIONS
Dear Falguni Leslie    Thank you for choosing [facility] ([facility]) for your transfusion.  The following information is a summary of our appointment as well as important reminders.    After Your Blood Transfusion  Discharge Instructions  After you leave  After a blood transfusion (received red blood cells, platelets, plasma, cryo or granulocytes), you will need to watch for signs of a reaction for the next 48 hours.  Call your clinic or 911 (or go to the Emergency room) if you have any signs of a reaction:    Shaking or chills    Fever (temperature above 100.0 F)    Headache    Nausea    Hives    Itching    Swelling of the face or feeling flushed    Ongoing dry cough (nothing is coughed up)    Trouble breathing or wheezing  Some signs of a reaction won't show up for a few days or up to 4 weeks.   These may include:    Fatigue    Dizziness    Pink or red urine  Your clinic is:   Hepatology [phone]  ________________________________________  For informational purposes only. Not to replace the advice of your health care provider.   Copyright   2015 [employer]. All rights reserved. [employer] 487285 - Rev 07/16.         We look forward in seeing you on your next appointment here at [facility].  Please don t hesitate to call us at [phone] to reschedule any of your appointments or to speak with one of the [facility] registered nurses.  It was a pleasure taking care of you today.    Sincerely,    [facility]  [address] [place] [address]  [id number]  Phone:  [phone]

## 2019-04-24 NOTE — PROGRESS NOTES
"Blood Product Transfusion Nursing Note:  Falguni Leslie presents today to [facility] for a 2 unit PRBC transfusion.  During today's [facility] appointment orders from Dr. Thomas Leventhal were completed.    Progress note:  ID verified by name and .  Assessment completed.  Vitals were stable throughout time in [facility].    Verbal and printed education given to patient regarding transfusion and possible side effects.  Patient verbalized understanding.     present during visit today: Not Applicable.    All pertinent labs reviewed prior to infusion: YES, 2 units today and 2 units tomorrow for a Hgb of 3.7.    Date of consent or authorization: 2019  PIV placed by vascular access RN.     2nd check: Unit 1: Mally Purvis RN. Unit 2: Rosie BARDALES RN    Patient tolerated the procedure well.  Note: Patient very fatigued throughout appointment today, sleeping most of infusion. Able to answer all questions appropriately.   Left in stable condition, pt called for transport home.     Transfusion given over approximately 2 hours for each unit.   Lasix 40 mg IVP given post transfusion per orders.     Discharge Plan:  AVS given to patient.   Patient to return tomorrow for transfusion.  Discharge instructions were reviewed with patient Yes  Patient/representative verbalized understanding of discharge instructions and all questions answered Yes.    Susana Conte RN     Administrations This Visit     furosemide (LASIX) injection 40 mg     Admin Date  2019 Action  Given Dose  40 mg Route  Intravenous Administered By  Susana Conte RN              Vital signs:  Temp: 98.3  F (36.8  C) Temp src: Oral BP: 113/67 Pulse: 99   Resp: 16 SpO2: 100 % O2 Device: None (Room air)        Estimated body mass index is 32.78 kg/m  as calculated from the following:    Height as of 19: 1.575 m (5' 2\").    Weight as of 19: 81.3 kg (179 lb 3.2 oz).            "

## 2019-04-25 NOTE — PROGRESS NOTES
Blood Product Transfusion Nursing Note:    Falguni Leslie presents today to [facility] for a 2 unit PRBC transfusion. (4 total- 2 yesterday, 2 today)  During today's [facility] appointment orders from Dr. Leventhal were completed.    Progress note:  ID verified by name and .  Assessment completed.  Vitals were stable throughout time in [facility].  verbal and printed education given to patient/representative regarding transfusion and possible side effects.  Patient/representative verbalized understanding.    Type and Crossmatch completed on:     All pertinent labs reviewed prior to infusion: YES, hgb 3.7     Blood Product order verified and double checked for accuracy: YES  2nd : Fiona Llamas RN    Date of consent or authorization:     Patient tolerated the procedure well        /69   Pulse 89   Temp 98.5  F (36.9  C)   Resp 16   SpO2 97%     Transfusion given over approximately  2 hours per unit       Furosemide administered post transfusion per orders.   Administrations This Visit     furosemide (LASIX) injection 40 mg     Admin Date  2019 Action  Given Dose  40 mg Route  Intravenous Administered By  Rosie Joseph, RN                Discharge Plan:    Discharge instructions were reviewed with patient Yes  Patient/representative verbalized understanding of discharge instructions and all questions answered Yes.    Discharged from [facility] at 1315 with self to home.    Rosie Joseph, RN

## 2019-04-25 NOTE — PATIENT INSTRUCTIONS
After Your Blood Transfusion  Discharge Instructions  After you leave  After a blood transfusion (received red blood cells, platelets, plasma, cryo or granulocytes), you will need to watch for signs of a reaction for the next 48 hours.  Call your clinic or 911 (or go to the Emergency room) if you have any signs of a reaction:   Shaking or chills  Fever (temperature above 100.0 F)   Headache  Nausea  Hives   Itching  Swelling of the face or feeling flushed  Ongoing dry cough (nothing is coughed up)  Trouble breathing or wheezing  S ome signs of a r eaction won't sh o w up for a f e w days or up to 4 w Mcgrath s .   These may include:  Fatigue  Dizziness  Pink or red urine

## 2019-06-10 NOTE — PATIENT INSTRUCTIONS
Patient Education     Iron Dextran Solution for injection  What is this medicine?  IRON DEXTRAN (AHY sadie DEX ruffin) is an iron complex. Iron is used to make healthy red blood cells, which carry oxygen and nutrients through the body. This medicine is used to treat people who cannot take iron by mouth and have low levels of iron in the blood.  This medicine may be used for other purposes; ask your health care provider or pharmacist if you have questions.  What should I tell my health care provider before I take this medicine?  They need to know if you have any of these conditions:    anemia not caused by low iron levels    heart disease    high levels of iron in the blood    kidney disease    liver disease    an unusual or allergic reaction to iron, other medicines, foods, dyes, or preservatives    pregnant or trying to get pregnant    breast-feeding  How should I use this medicine?  This medicine is for injection into a vein or a muscle. It is given by a health care professional in a hospital or clinic setting.  Talk to your pediatrician regarding the use of this medicine in children. While this drug may be prescribed for children as young as 4 months old for selected conditions, precautions do apply.  Overdosage: If you think you have taken too much of this medicine contact a poison control center or emergency room at once.  NOTE: This medicine is only for you. Do not share this medicine with others.  What if I miss a dose?  It is important not to miss your dose. Call your doctor or health care professional if you are unable to keep an appointment.  What may interact with this medicine?  Do not take this medicine with any of the following medications:    deferoxamine    dimercaprol    other iron products  This medicine may also interact with the following medications:    chloramphenicol    deferasirox  This list may not describe all possible interactions. Give your health care provider a list of all the medicines,  herbs, non-prescription drugs, or dietary supplements you use. Also tell them if you smoke, drink alcohol, or use illegal drugs. Some items may interact with your medicine.  What should I watch for while using this medicine?  Visit your doctor or health care professional regularly. Tell your doctor if your symptoms do not start to get better or if they get worse. You may need blood work done while you are taking this medicine.  You may need to follow a special diet. Talk to your doctor. Foods that contain iron include: whole grains/cereals, dried fruits, beans, or peas, leafy green vegetables, and organ meats (liver, kidney).  Long-term use of this medicine may increase your risk of some cancers. Talk to your doctor about how to limit your risk.  What side effects may I notice from receiving this medicine?  Side effects that you should report to your doctor or health care professional as soon as possible:    allergic reactions like skin rash, itching or hives, swelling of the face, lips, or tongue    blue lips, nails, or skin    breathing problems    changes in blood pressure    chest pain    confusion    fast, irregular heartbeat    feeling faint or lightheaded, falls    fever or chills    flushing, sweating, or hot feelings    joint or muscle aches or pains    pain, tingling, numbness in the hands or feet    seizures    unusually weak or tired  Side effects that usually do not require medical attention (report to your doctor or health care professional if they continue or are bothersome):    change in taste (metallic taste)    diarrhea    headache    irritation at site where injected    nausea, vomiting    stomach upset  This list may not describe all possible side effects. Call your doctor for medical advice about side effects. You may report side effects to FDA at 7-148-FDA-2924.  Where should I keep my medicine?  This drug is given in a hospital or clinic and will not be stored at home.  NOTE:This sheet is a  summary. It may not cover all possible information. If you have questions about this medicine, talk to your doctor, pharmacist, or health care provider. Copyright  2016 [employer] Standard

## 2019-06-10 NOTE — PROGRESS NOTES
Nursing Note  Falguni Leslie presents today [facility] for:   Chief Complaint   Patient presents with     Infusion     infed     During [facility]'s [facility] appointment, orders from Dr. Leventhal   were completed.  Frequency: every 2 weeks and today is dose 1 of 2 total. FIRST DOSE    Progress note:  Patient identification verified by name and date of birth.  Assessment completed.  Vitals recorded in Doc Flowsheets.  Patient was provided with education regarding infusion and possible side effects.  Patient verbalized understanding.     present during visit today: Not Applicable.    Treatment Conditions: Test dose given over 5 minutes patient monitored for 60 minutes after test dose medication was infused.    Premedications: none- no history of reaction.    Drug Waste Record: No    Infusion length and rate:  infusion given over approximately 90 minutes  373 ml/hr.    Labs: were not ordered for this appointment.    Vascular access: peripheral IV placed today.    Post Infusion Assessment:  Patient tolerated infusion without incident.     Discharge Plan:   Follow up plan of care with: ongoing infusions at [facility].  Discharge instructions were reviewed with patient.  Patient/representative verbalized understanding of discharge instructions and all questions answered.  Patient discharged from [facility] in stable condition.    Rut Lenz RN    Administrations This Visit     iron dextran complex (INFED) 25 mg in sodium chloride 0.9 % 50 mL intermittent infusion     Admin Date  06/10/2019 Action  New Bag Dose  25 mg Rate  600 mL/hr Route  Intravenous Administered By  Rut Lenz RN                /63   Pulse 79   Temp 98  F (36.7  C) (Oral)   Resp 16   SpO2 95%

## 2019-06-24 NOTE — PATIENT INSTRUCTIONS
Dear Falguni Leslie    Thank you for choosing [facility] ([facility]) for your infusion.  The following information is a summary of our appointment as well as important reminders.      GO to the Emergency room if symptoms get worst.    We look forward in seeing you on your next appointment here at [facility] ([facility]).  Please don t hesitate to call us at [phone] to reschedule any of your appointments or to speak with one of the [facility] registered nurses.  It was a pleasure taking care of you today.    Sincerely,    [facility]  [facility]  [address] [place] [address]  [id number]  Phone:  [phone]      Patient Education     Iron Dextran Solution for injection  What is this medicine?  IRON DEXTRAN (AHY sadie DEX ruffin) is an iron complex. Iron is used to make healthy red blood cells, which carry oxygen and nutrients through the body. This medicine is used to treat people who cannot take iron by mouth and have low levels of iron in the blood.  This medicine may be used for other purposes; ask your health care provider or pharmacist if you have questions.  What should I tell my health care provider before I take this medicine?  They need to know if you have any of these conditions:    anemia not caused by low iron levels    heart disease    high levels of iron in the blood    kidney disease    liver disease    an unusual or allergic reaction to iron, other medicines, foods, dyes, or preservatives    pregnant or trying to get pregnant    breast-feeding  How should I use this medicine?  This medicine is for injection into a vein or a muscle. It is given by a health care professional in a hospital or clinic setting.  Talk to your pediatrician regarding the use of this medicine in children. While this drug may be prescribed for children as young as 4 months old for  selected conditions, precautions do apply.  Overdosage: If you think you have taken too much of this medicine contact a poison control center or emergency room at once.  NOTE: This medicine is only for you. Do not share this medicine with others.  What if I miss a dose?  It is important not to miss your dose. Call your doctor or health care professional if you are unable to keep an appointment.  What may interact with this medicine?  Do not take this medicine with any of the following medications:    deferoxamine    dimercaprol    other iron products  This medicine may also interact with the following medications:    chloramphenicol    deferasirox  This list may not describe all possible interactions. Give your health care provider a list of all the medicines, herbs, non-prescription drugs, or dietary supplements you use. Also tell them if you smoke, drink alcohol, or use illegal drugs. Some items may interact with your medicine.  What should I watch for while using this medicine?  Visit your doctor or health care professional regularly. Tell your doctor if your symptoms do not start to get better or if they get worse. You may need blood work done while you are taking this medicine.  You may need to follow a special diet. Talk to your doctor. Foods that contain iron include: whole grains/cereals, dried fruits, beans, or peas, leafy green vegetables, and organ meats (liver, kidney).  Long-term use of this medicine may increase your risk of some cancers. Talk to your doctor about how to limit your risk.  What side effects may I notice from receiving this medicine?  Side effects that you should report to your doctor or health care professional as soon as possible:    allergic reactions like skin rash, itching or hives, swelling of the face, lips, or tongue    blue lips, nails, or skin    breathing problems    changes in blood pressure    chest pain    confusion    fast, irregular heartbeat    feeling faint or  lightheaded, falls    fever or chills    flushing, sweating, or hot feelings    joint or muscle aches or pains    pain, tingling, numbness in the hands or feet    seizures    unusually weak or tired  Side effects that usually do not require medical attention (report to your doctor or health care professional if they continue or are bothersome):    change in taste (metallic taste)    diarrhea    headache    irritation at site where injected    nausea, vomiting    stomach upset  This list may not describe all possible side effects. Call your doctor for medical advice about side effects. You may report side effects to FDA at 5-059-FDA-7110.  Where should I keep my medicine?  This drug is given in a hospital or clinic and will not be stored at home.  NOTE:This sheet is a summary. It may not cover all possible information. If you have questions about this medicine, talk to your doctor, pharmacist, or health care provider. Copyright  2016 [employer] Standard

## 2019-06-24 NOTE — PROGRESS NOTES
Nursing Note  Falguni Leslie presents today to [facility] for:   Chief Complaint   Patient presents with     Infusion     INFED     During [facility]'s [facility] appointment, orders from Dr. Leventhal   were completed.  Frequency: every 2 weeks and today is dose 2 of 2 total.   Patient arrived today and stated that she so nauseated and fatigued. Call placed to Leventhal, Thomas Michael, MD, OK to gave infusion, but encourage patient to go to ED after. Patient understands this information. But does not want to go to the ED or Hospital.  Progress note:  Patient identification verified by name and date of birth.  Assessment completed.  Vitals recorded in Doc Flowsheets.  Patient was provided with education regarding infusion and possible side effects.  Patient verbalized understanding.     present during visit today: Not Applicable.    Premedications: none- no history of reaction.    Administrations This Visit     iron dextran complex (INFED) 500 mg in sodium chloride 0.9 % 500 mL intermittent infusion     Admin Date  06/24/2019 Action  New Bag Dose  500 mg Rate  560 mL/hr Route  Intravenous Administered By  Eula Vaughn RN          ondansetron (ZOFRAN) injection 4 mg     Admin Date  06/24/2019 Action  Given Dose  4 mg Route  Intravenous Administered By  Eula Vaughn RN                Drug Waste Record: No    Infusion length and rate:  infusion given over approximately 1 hour    Labs: were ordered for this appointment.    Vascular access: peripheral IV placed today.    Post Infusion Assessment:  Patient tolerated infusion without incident.     Discharge Plan:   Follow up plan of care with: ongoing infusions at [facility].  Discharge instructions were reviewed with patient.  Patient/representative verbalized understanding of discharge instructions and all questions answered.  Patient discharged from  [facility] in stable condition.    Eula Freitas RN        /64   Pulse 86   Temp 97.5  F (36.4  C) (Oral)   Resp 18   SpO2 94%

## 2019-07-03 NOTE — TELEPHONE ENCOUNTER
Left message for pt reminding them of upcoming appointment.  Instructed pt to bring updated medications list.  Elizabeth Soto CMA  7/3/2019 10:29 AM

## 2019-10-15 NOTE — TELEPHONE ENCOUNTER
DATE:  10/15/2019   TIME OF RECEIPT FROM LAB:  1:20  LAB TEST:  Hemoglobin  LAB VALUE:  3.4  RESULTS GIVEN WITH READ-BACK TO (PROVIDER):  Dr. Leventhal  TIME LAB VALUE REPORTED TO PROVIDER:   1:30      Per Dr. Leventhal, patient should go immediately to the closest hospital emergency room for evaluation and treatment. The patient request to go to [facility] for outpatient transfusion due to lack of insurance and h/o PTSD related to hospitals. [facility] unable to provide transfusion this late in the day, and due to extremely low hemoglobin, also referred patient to an emergency room. Patient agreed to go to her closest emergency room, and states she will call a cab and go to [facility]. Provider aware, and [facility] was called.

## 2019-10-21 NOTE — TELEPHONE ENCOUNTER
Left message for pt reminding them of upcoming appointment.  Instructed pt to bring updated medications list.  Nell Anderson on 10/21/2019 at 11:20 AM